# Patient Record
Sex: FEMALE | Race: BLACK OR AFRICAN AMERICAN
[De-identification: names, ages, dates, MRNs, and addresses within clinical notes are randomized per-mention and may not be internally consistent; named-entity substitution may affect disease eponyms.]

---

## 2020-07-04 ENCOUNTER — HOSPITAL ENCOUNTER (EMERGENCY)
Dept: HOSPITAL 61 - ER | Age: 60
Discharge: HOME | End: 2020-07-04
Payer: OTHER GOVERNMENT

## 2020-07-04 VITALS — BODY MASS INDEX: 21.45 KG/M2 | WEIGHT: 125.66 LBS | HEIGHT: 64 IN

## 2020-07-04 VITALS — DIASTOLIC BLOOD PRESSURE: 90 MMHG | SYSTOLIC BLOOD PRESSURE: 186 MMHG

## 2020-07-04 DIAGNOSIS — Z88.1: ICD-10-CM

## 2020-07-04 DIAGNOSIS — K59.00: Primary | ICD-10-CM

## 2020-07-04 DIAGNOSIS — Z88.8: ICD-10-CM

## 2020-07-04 DIAGNOSIS — I10: ICD-10-CM

## 2020-07-04 PROCEDURE — 99284 EMERGENCY DEPT VISIT MOD MDM: CPT

## 2020-07-04 NOTE — PHYS DOC
Past Medical History


Past Medical History:  Hypertension


Past Surgical History:  No Surgical History


Smoking Status:  Never Smoker


Alcohol Use:  None





General Adult


EDM:


Chief Complaint:  CONSTIPATION





HPI:


HPI:





Patient is a 59  year old female who presents with constipation. She states she 

has been having problems for the last couple weeks.  She was able to finally 

have a bowel movement last week but since that time has not been able to go.  

She states that she has had a manually disimpact herself.  She is taken Dulcolax

at home without relief.  She has been increasing her fiber without relief.  She 

has not tried anything else at home.  She has rectal pain and pressure but 

denies any abdominal pain.  She does not have any vomiting.





Review of Systems:


Review of Systems:


General: Denies fever, chills, sweats, fatigue


Eyes: Denies drainage, blurred vision, eye redness


HENT: Denies rhinorrhea, sore throat, earache


Respiratory: Denies cough, shortness of breath, wheezing


Cardiac: Denies edema, palpitations, chest pain


GI: Denies abdominal pain, Nausea, vomiting reports constipation


MSK: Denies back pain, neck pain


Skin: Denies rash, jaundice


Neuro: Denies headache, dizziness


Psychiatric: Denies SI/HI





Heart Score:


Risk Factors:


Risk Factors:  DM, Current or recent (<one month) smoker, HTN, HLP, family 

history of CAD, obesity.


Risk Scores:


Score 0 - 3:  2.5% MACE over next 6 weeks - Discharge Home


Score 4 - 6:  20.3% MACE over next 6 weeks - Admit for Clinical Observation


Score 7 - 10:  72.7% MACE over next 6 weeks - Early Invasive Strategies





Current Medications:





Current Medications








 Medications


  (Trade)  Dose


 Ordered  Sig/Nithin  Start Time


 Stop Time Status Last Admin


Dose Admin


 


 Docusate Sodium


  (Enemeez)  283 mg  1X  ONCE  7/4/20 11:00


 7/4/20 11:01 DC 7/4/20 10:56


283 MG


 


 Magnesium Citrate


  (Citroma)  296 ml  1X  ONCE  7/4/20 11:00


 7/4/20 11:01 DC 7/4/20 10:57


296 ML











Allergies:


Allergies:





Allergies








Coded Allergies Type Severity Reaction Last Updated Verified


 


  ampicillin Allergy Mild  7/4/20 Yes


 


  prochlorperazine Allergy Mild  7/4/20 Yes











Physical Exam:


PE:


General: Awake, alert, NAD. Well Nourished, well hydrated. Cooperative


HEENT: Atraumatic, EOMI, PERRL, airway patent, moist oral mucosa


Neck: Supple, trachea midline


Respiratory: CTA bilaterally, normal effort, no wheezing/crackles


CV: RRR, no murmur, cap refill <2


GI: Soft, nondistended, nontender, no masses


MSK: No obvious deformities


Skin: Warm, dry, intact


Neuro: A&O x3, speech NL, sensory and motor grossly intact, no focal deficits


Psych: Normal affect, normal mood, not suicidal or homicidal





Current Patient Data:


Vital Signs:





                                   Vital Signs








  Date Time  Temp Pulse Resp B/P (MAP) Pulse Ox O2 Delivery O2 Flow Rate FiO2


 


7/4/20 10:00 97.7 62 16 186/90 (122) 100 Room Air  





 97.7       











EKG:


EKG:


[]





Radiology/Procedures:


Radiology/Procedures:


[]





Course & Med Decision Making:


Course & Med Decision Making


Pertinent Labs and Imaging studies reviewed. (See chart for details)





Patient is a 59-year-old female who presents to the emergency room complaining 

of constipation.  She is only tried Dulcolax at home.  She will be given 

magnesium citrate and an enema.  After enema patient is feeling much better and 

would like to go home.  We will place her on MiraLAX for the next several days. 

 Patient's test results and vitals while in the ED were fully reviewed and 

discussed with the patient. Patient is stable and at this time does not need 

admission to the hospital. We have discussed strict return precautions and the 

importance of following up with their Primary Care Physician. Patient stated 

understanding and was given an opportunity to ask any questions. Patient is in 

agreement with plan.





Dragon Disclaimer:


Dragon Disclaimer:


This electronic medical record was generated, in whole or in part, using a voice

 recognition dictation system.





Departure


Departure


Impression:  


   Primary Impression:  


   Constipation


Disposition:  01 HOME, SELF-CARE


Condition:  IMPROVED


Referrals:  


UNKNOWN PCP NAME (PCP)


Patient Instructions:  Constipation, Adult, Easy-to-Read


Scripts


Polyethylene Glycol 3350 (MIRALAX) 119 Gm Powder


17 GM PO DAILY for constipation, #255 GM 0 Refills


   dissolve in water


   Prov: ARTEM RACHEL MD         7/4/20





Justicifation of Admission Dx:


Justifications for Admission:


Justification of Admission Dx:  No











ARTEM RACHEL MD               Jul 4, 2020 13:16